# Patient Record
(demographics unavailable — no encounter records)

---

## 2024-12-16 NOTE — IMAGING
[de-identified] : Cervical spine: Positive tenderness bilateral trapezius musculature left greater than right, positive muscle spasm noted left trapezius musculature, limited range of motion with lateral rotation due to pain and stiffness, 5 out of 5 strength upper extremities, 2+ reflexes, neurovascular intact.  X-ray cervical spine 4 views: No fractures or bony abnormalities noted, straightening of normal curvature noted.
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
## 2024-12-18 NOTE — PHYSICAL EXAM
[FreeTextEntry1] : PHYSICAL EXAMINATION: Head: Normocephalic, atraumatic. Negative TA tenderness/prominence.   Neck: Supple with full range of motion; nontender with negative bilateral Spurling's signs.   Spine: Full range of motion; nontender. Negative straight leg raise maneuvers.   Extremities: Non-tender. Atraumatic. Negative Tinel's signs.   NEUROLOGICAL EXAMINATION:   Mental Status: Patient is a good informant with intact orientation, attention, concentration, recent and remote memory. Language evaluation reveals no evidence of aphasia. Fund of knowledge is normal.   Cranial Nerves Cranial Nerves:   II, III, IV, VI: Pupils are equal, round, and reactive to light and accommodation. No evidence of afferent pupillary defect. Visual fields are full to confrontation. Eye movements are full without evidence of nystagmus or internuclear ophthalmoplegia. Funduscopic examination reveals sharp disc margins.   V: Normal jaw movements. Normal facial sensation.   VII: Normal facial motor testing.   VIII: Grossly normal hearing bilaterally.   IX, X: Palate moves symmetrically. No dysarthria.   XI: Normal shoulder shrug and sternocleidomastoid power.   XII: Tongue appears normal and protrudes in the midline.   Motor: Normal bulk, tone, and power throughout.   Muscle Stretch Reflexes (right/left): 2+ symmetrical.   Plantar Responses: Flexor bilaterally.   Coordination: Normal finger to nose and heel to shin testing, no truncal ataxia and no tremor.   Sensation: Normal primary sensation. Normal double simultaneous stimulation.   Gait and Station: Normal base, stride, and turning. Normal toe and heel walking. Normal tandem. Negative Romberg.
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
## 2025-01-13 NOTE — IMAGING
[de-identified] : Cervical spine: mild tenderness bilateral trapezius musculature, near full ROM with lateral rotation, 5 out of 5 strength upper extremities, 2+ reflexes, neurovascularly intact.

## 2025-01-13 NOTE — IMAGING
[de-identified] : Cervical spine: mild tenderness bilateral trapezius musculature, near full ROM with lateral rotation, 5 out of 5 strength upper extremities, 2+ reflexes, neurovascularly intact.
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
## 2025-01-27 NOTE — PHYSICAL EXAM
[General Appearance - Alert] : alert [General Appearance - In No Acute Distress] : in no acute distress [Oriented To Time, Place, And Person] : oriented to person, place, and time [Impaired Insight] : insight and judgment were intact [Affect] : the affect was normal [Person] : oriented to person [Place] : oriented to place [Time] : oriented to time [Concentration Intact] : normal concentrating ability [Visual Intact] : visual attention was ~T not ~L decreased [Naming Objects] : no difficulty naming common objects [Repeating Phrases] : no difficulty repeating a phrase [Writing A Sentence] : no difficulty writing a sentence [Fluency] : fluency intact [Comprehension] : comprehension intact [Reading] : reading intact [Past History] : adequate knowledge of personal past history [Cranial Nerves Optic (II)] : visual acuity intact bilaterally,  visual fields full to confrontation, pupils equal round and reactive to light [Cranial Nerves Oculomotor (III)] : extraocular motion intact [Cranial Nerves Trigeminal (V)] : facial sensation intact symmetrically [Cranial Nerves Facial (VII)] : face symmetrical [Cranial Nerves Vestibulocochlear (VIII)] : hearing was intact bilaterally [Cranial Nerves Glossopharyngeal (IX)] : tongue and palate midline [Cranial Nerves Accessory (XI - Cranial And Spinal)] : head turning and shoulder shrug symmetric [Cranial Nerves Hypoglossal (XII)] : there was no tongue deviation with protrusion [Motor Strength] : muscle strength was normal in all four extremities [No Muscle Atrophy] : normal bulk in all four extremities [Sensation Tactile Decrease] : light touch was intact [Balance] : balance was intact [Past-pointing] : there was no past-pointing [Tremor] : no tremor present [2+] : Ankle jerk left 2+ [Plantar Reflex Right Only] : normal on the right [Plantar Reflex Left Only] : normal on the left [Neck Appearance] : the appearance of the neck was normal [FreeTextEntry1] : cervical strain improving  [Abnormal Walk] : normal gait [Involuntary Movements] : no involuntary movements were seen [Motor Tone] : muscle strength and tone were normal
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
## 2025-02-24 NOTE — IMAGING
[de-identified] : Cervical spine: mild tenderness bilateral trapezius musculature, near full ROM with lateral rotation, 5 out of 5 strength upper extremities, 2+ reflexes, neurovascularly intact.   Lumbar spine: 5 out of 5 strength lower extremities, 2+ reflexes, negative straight leg raise bilaterally, positive tenderness lumbar paraspinal muscle, pain and decreased range of motion with flexion.

## 2025-02-24 NOTE — ASSESSMENT
[FreeTextEntry1] : 27-year-old female with cervical and lumbar strain, cervical lumbar decal apathy.  I have provided with prescriptions to undergo MRIs of both the cervical and lumbar spine for further assessment as well as for physical therapy 2-3 times a week for the next 6 weeks.  Should follow-up in 4 weeks for reassessment is aware if there is any issues she contact the office and she verbalized understanding and agreement.
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
## 2025-03-31 NOTE — IMAGING
[de-identified] : TTP midline cervical spine and paraspinal musculature   Strength                                                                     Deltoid   Right: 5/5; Left: 5/5                      Biceps   Right: 5/5; Left: 5/5                   Triceps        Right: 5/5; Left: 5/5                                 Wrist Extensors     Right: 5/5; Left: 5/5 Finger Flexors     Right: 5/5; Left: 5/5 IO    Right: 5/5; Left: 5/5  Sensation C5   Right: 2/2; Left: 2/2 C6   Right: 2/2; Left: 2/2 C7   Right: 2/2; Left: 2/2 C8   Right: 2/2; Left: 2/2 T1   Right: 2/2; Left: 2/2  Reflexes Biceps   Right: 2+; Left 2+ Triceps   Right: 2+; Left 2+ Harrison's  Right: Negative; L: Negative

## 2025-03-31 NOTE — DATA REVIEWED
[FreeTextEntry1] : MRI cervical lumbar spine reviewed with the patient.  Discussed the findings on her thyroid.  She is aware of her thyroid issues.  She is out on her report.  She has thyroid disease.
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
